# Patient Record
Sex: MALE | Race: WHITE
[De-identification: names, ages, dates, MRNs, and addresses within clinical notes are randomized per-mention and may not be internally consistent; named-entity substitution may affect disease eponyms.]

---

## 2020-05-17 ENCOUNTER — HOSPITAL ENCOUNTER (INPATIENT)
Dept: HOSPITAL 61 - ER | Age: 55
LOS: 3 days | Discharge: HOME | DRG: 897 | End: 2020-05-20
Attending: INTERNAL MEDICINE | Admitting: INTERNAL MEDICINE
Payer: COMMERCIAL

## 2020-05-17 VITALS — BODY MASS INDEX: 25.34 KG/M2 | WEIGHT: 152.12 LBS | HEIGHT: 65 IN

## 2020-05-17 VITALS — DIASTOLIC BLOOD PRESSURE: 94 MMHG | SYSTOLIC BLOOD PRESSURE: 147 MMHG

## 2020-05-17 VITALS — DIASTOLIC BLOOD PRESSURE: 100 MMHG | SYSTOLIC BLOOD PRESSURE: 152 MMHG

## 2020-05-17 VITALS — DIASTOLIC BLOOD PRESSURE: 94 MMHG | SYSTOLIC BLOOD PRESSURE: 148 MMHG

## 2020-05-17 DIAGNOSIS — F10.239: Primary | ICD-10-CM

## 2020-05-17 DIAGNOSIS — Z71.41: ICD-10-CM

## 2020-05-17 DIAGNOSIS — D69.6: ICD-10-CM

## 2020-05-17 DIAGNOSIS — E87.2: ICD-10-CM

## 2020-05-17 DIAGNOSIS — M47.9: ICD-10-CM

## 2020-05-17 DIAGNOSIS — E16.1: ICD-10-CM

## 2020-05-17 DIAGNOSIS — Z79.899: ICD-10-CM

## 2020-05-17 DIAGNOSIS — M50.322: ICD-10-CM

## 2020-05-17 LAB
ACETAMIN: < 2 MCG/ML (ref 10–30)
ALBUMIN SERPL-MCNC: 3.6 G/DL (ref 3.4–5)
ALBUMIN/GLOB SERPL: 0.9 {RATIO} (ref 1–1.7)
ALP SERPL-CCNC: 116 U/L (ref 46–116)
ALT SERPL-CCNC: 158 U/L (ref 16–63)
ANION GAP SERPL CALC-SCNC: 19 MMOL/L (ref 6–14)
AST SERPL-CCNC: 273 U/L (ref 15–37)
BASOPHILS # BLD AUTO: 0 X10^3/UL (ref 0–0.2)
BASOPHILS NFR BLD: 1 % (ref 0–3)
BILIRUB SERPL-MCNC: 1.4 MG/DL (ref 0.2–1)
BUN SERPL-MCNC: 10 MG/DL (ref 8–26)
BUN/CREAT SERPL: 10 (ref 6–20)
CALCIUM SERPL-MCNC: 8.7 MG/DL (ref 8.5–10.1)
CHLORIDE SERPL-SCNC: 98 MMOL/L (ref 98–107)
CO2 SERPL-SCNC: 19 MMOL/L (ref 21–32)
CREAT SERPL-MCNC: 1 MG/DL (ref 0.7–1.3)
EOSINOPHIL NFR BLD: 0 % (ref 0–3)
EOSINOPHIL NFR BLD: 0 X10^3/UL (ref 0–0.7)
ERYTHROCYTE [DISTWIDTH] IN BLOOD BY AUTOMATED COUNT: 15.1 % (ref 11.5–14.5)
ETHANOL SERPL-MCNC: 25 MG/DL (ref 0–10)
GFR SERPLBLD BASED ON 1.73 SQ M-ARVRAT: 77.9 ML/MIN
GLOBULIN SER-MCNC: 3.9 G/DL (ref 2.2–3.8)
GLUCOSE SERPL-MCNC: 106 MG/DL (ref 70–99)
HCT VFR BLD CALC: 44.3 % (ref 39–53)
HGB BLD-MCNC: 15 G/DL (ref 13–17.5)
LYMPHOCYTES # BLD: 0.4 X10^3/UL (ref 1–4.8)
LYMPHOCYTES NFR BLD AUTO: 8 % (ref 24–48)
MAGNESIUM SERPL-MCNC: 2.1 MG/DL (ref 1.8–2.4)
MCH RBC QN AUTO: 31 PG (ref 25–35)
MCHC RBC AUTO-ENTMCNC: 34 G/DL (ref 31–37)
MCV RBC AUTO: 93 FL (ref 79–100)
MONO #: 0.7 X10^3/UL (ref 0–1.1)
MONOCYTES NFR BLD: 13 % (ref 0–9)
NEUT #: 4.3 X10^3/UL (ref 1.8–7.7)
NEUTROPHILS NFR BLD AUTO: 78 % (ref 31–73)
PLATELET # BLD AUTO: 97 X10^3/UL (ref 140–400)
POTASSIUM SERPL-SCNC: 3.7 MMOL/L (ref 3.5–5.1)
PROT SERPL-MCNC: 7.5 G/DL (ref 6.4–8.2)
RBC # BLD AUTO: 4.78 X10^6/UL (ref 4.3–5.7)
SALIC: < 2.8 MG/DL (ref 2.8–20)
SODIUM SERPL-SCNC: 136 MMOL/L (ref 136–145)
WBC # BLD AUTO: 5.5 X10^3/UL (ref 4–11)

## 2020-05-17 PROCEDURE — 83735 ASSAY OF MAGNESIUM: CPT

## 2020-05-17 PROCEDURE — 72125 CT NECK SPINE W/O DYE: CPT

## 2020-05-17 PROCEDURE — 80329 ANALGESICS NON-OPIOID 1 OR 2: CPT

## 2020-05-17 PROCEDURE — 73080 X-RAY EXAM OF ELBOW: CPT

## 2020-05-17 PROCEDURE — G0480 DRUG TEST DEF 1-7 CLASSES: HCPCS

## 2020-05-17 PROCEDURE — 85025 COMPLETE CBC W/AUTO DIFF WBC: CPT

## 2020-05-17 PROCEDURE — 83690 ASSAY OF LIPASE: CPT

## 2020-05-17 PROCEDURE — 70450 CT HEAD/BRAIN W/O DYE: CPT

## 2020-05-17 PROCEDURE — 36415 COLL VENOUS BLD VENIPUNCTURE: CPT

## 2020-05-17 PROCEDURE — G0378 HOSPITAL OBSERVATION PER HR: HCPCS

## 2020-05-17 PROCEDURE — 80053 COMPREHEN METABOLIC PANEL: CPT

## 2020-05-17 RX ADMIN — BACITRACIN SCH MLS/HR: 5000 INJECTION, POWDER, FOR SOLUTION INTRAMUSCULAR at 21:54

## 2020-05-17 NOTE — RAD
PROCEDURE: ELBOW RIGHT 3V

 

STUDY DATE: 5/17/2020

 

CLINICAL INDICATION / HISTORY: Right elbow pain after a fall.

 

TECHNIQUE: Right Elbow 3 views

 

COMPARISON: None

 

FINDINGS: AP lateral and oblique views of the right elbow demonstrate no 

evidence of fracture, subluxation, or dislocation. Soft tissues 

unremarkable.

 

IMPRESSION: No acute osseous abnormality.

 

Electronically signed by: Garry Trejo MD (5/17/2020 6:17 PM) 

[facility] – [place]

## 2020-05-17 NOTE — RAD
CT head and cervical spine without contrast

 

History: Seizure, fall, hit head on ground, headache and neck pain

 

Technique: Noncontrast CT imaging was performed of the head and cervical 

spine. Multiplanar reconstruction images are submitted.  Exposure: One or 

more of the following individualized dose reduction techniques were 

utilized for this examination:  1. Automated exposure control  2. 

Adjustment of the mA and/or kV according to patient size  3. Use of 

iterative reconstruction technique.

 

Head CT 

 

Comparison: None

 

Findings: No acute extra-axial or parenchymal hemorrhage is identified. 

There is no significant intra-axial mass effect, midline shift, or 

extra-axial fluid collection. The gray-white differentiation of the major 

vascular territories is preserved. The ventricles, sulci, and cisterns are

within normal limits in size and configuration.   The mastoid air cells 

and the visualized paranasal sinuses are aerated. There is no significant 

focal calvarial abnormality.

 

Impression:

1. No acute intracranial abnormality is identified.

 

Cervical spine CT 

 

Comparison:  None

 

Findings: No acute cervical spine fracture is identified. Vertebral body 

stature and AP alignment are within normal limits. Atlanto-axial distance 

is within normal limits. There is appropriate alignment of lateral masses 

of C1 relative to C2. Occipital condylar-C1 relationship is maintained. 

There is mild-to-moderate C5-6 degenerative disc disease, also mild 

spondylosis at this level. There is multilevel cervical facet degenerative

change.

 

Impression:

1. No acute cervical spine fracture is identified.

2. There is C5-6 degenerative disc disease and spondylosis.

 

Electronically signed by: Kana Ochoa MD (5/17/2020 5:16 PM) [facility]

## 2020-05-17 NOTE — PHYS DOC
Past Medical History


Past Medical History:  Alcoholism


Past Surgical History:  No Surgical History


Smoking Status:  Never Smoker


Alcohol Use:  Heavy





General Adult


EDM:


Chief Complaint:  SEIZURE





HPI:


HPI:





Patient is a 55  year old male who was brought here by EMS from home after he 

had a seizure.  His wife witnessed him shaky and then passed out fell down he 

hit head on the ground.  Patient said he is an alcoholic he decided to stop 

drinking 2 days ago.  Patient drinks heavily every day.  Patient denies suicidal

ideation, denies homicidal ideation.  Patient said he might have hit the right 

elbow on the ground when he fell down because he complained of right elbow pain.

Patient denies any abdominal pain , no chest pain.  Patient denies any history 

of seizure disorder or denies history of withdrawal seizure from alcohol in the 

past.





Review of Systems:


Review of Systems:


Constitutional:  Denies fever or chills. []


Eyes:  Denies change in visual acuity. []


HENT:  Denies nasal congestion or sore throat. [] 


Respiratory:  Denies cough or shortness of breath. [] 


Cardiovascular:  Denies chest pain or edema. [] 


GI:  Denies abdominal pain, nausea, vomiting, bloody stools or diarrhea. [] 


:  Denies dysuria. [] 


Musculoskeletal:  Denies back pain , positive for neck pain, right elbow pain.


Integument:  Denies rash. [] 


Neurologic: Positive for headache, no  focal weakness or sensory changes. [] 


Endocrine:  Denies polyuria or polydipsia. [] 


Lymphatic:  Denies swollen glands. [] 


Psychiatric: Positive for anxiety shakiness.  Denies homicidal ideation denies 

suicidal ideation.





Heart Score:


Risk Factors:


Risk Factors:  DM, Current or recent (<one month) smoker, HTN, HLP, family 

history of CAD, obesity.


Risk Scores:


Score 0 - 3:  2.5% MACE over next 6 weeks - Discharge Home


Score 4 - 6:  20.3% MACE over next 6 weeks - Admit for Clinical Observation


Score 7 - 10:  72.7% MACE over next 6 weeks - Early Invasive Strategies





Current Medications:





Current Medications








 Medications


  (Trade)  Dose


 Ordered  Sig/Shelton  Start Time


 Stop Time Status Last Admin


Dose Admin


 


 Chlordiazepoxide


  (Librium)  25 mg  1X  ONCE  20 16:30


 20 16:33 DC  





 


 Lorazepam


  (Ativan Inj)  2 mg  1X  ONCE  20 16:30


 5/17/20 16:33 DC 20 16:28


2 MG


 


 Sodium Chloride  1,000 ml @ 


 1,000 mls/hr  1X  ONCE  20 15:00


 20 15:59 DC 20 15:06


1,000 MLS/HR











Allergies:


Allergies:





Allergies








Coded Allergies Type Severity Reaction Last Updated Verified


 


  No Known Drug Allergies    20 No











Physical Exam:


PE:





Constitutional: Well developed, well nourished, no acute distress, non-toxic 

appearance. []


HENT: Normocephalic, atraumatic, bilateral external ears normal, oropharynx 

moist, no oral exudates, nose normal. []


Eyes: PERRLA, EOMI, conjunctiva normal, no discharge. [] 


Neck: Normal range of motion, no tenderness, supple, no stridor. [] 


Cardiovascular: sinus tachycardia, regular rhythm, no murmur []


Lungs & Thorax:  Bilateral breath sounds clear to auscultation []


Abdomen: Bowel sounds normal, soft, no tenderness, no masses, no pulsatile 

masses. [] 


Skin: Warm, dry, no erythema, no rash. [] 


Back: No tenderness, no CVA tenderness. [] 


Extremities: No tenderness, no cyanosis, no clubbing, ROM intact, no edema. 

right elbow contusion, tender to palpation, no deformity. 


Neurologic: Alert and oriented X 3, normal motor function, normal sensory fun

ction, no focal deficits noted. []


Psychologic: Affect normal, judgement normal, mood normal.  APPEARED VERY 

ANXIOUS, twisting and shaking.





Current Patient Data:


Labs:





                                Laboratory Tests








Test


 20


14:55


 


White Blood Count


 5.5 x10^3/uL


(4.0-11.0)


 


Red Blood Count


 4.78 x10^6/uL


(4.30-5.70)


 


Hemoglobin


 15.0 g/dL


(13.0-17.5)


 


Hematocrit


 44.3 %


(39.0-53.0)


 


Mean Corpuscular Volume


 93 fL ()





 


Mean Corpuscular Hemoglobin 31 pg (25-35)  


 


Mean Corpuscular Hemoglobin


Concent 34 g/dL


(31-37)


 


Red Cell Distribution Width


 15.1 %


(11.5-14.5)  H


 


Platelet Count


 97 x10^3/uL


(140-400)  L


 


Neutrophils (%) (Auto) 78 % (31-73)  H


 


Lymphocytes (%) (Auto) 8 % (24-48)  L


 


Monocytes (%) (Auto) 13 % (0-9)  H


 


Eosinophils (%) (Auto) 0 % (0-3)  


 


Basophils (%) (Auto) 1 % (0-3)  


 


Neutrophils # (Auto)


 4.3 x10^3/uL


(1.8-7.7)


 


Lymphocytes # (Auto)


 0.4 x10^3/uL


(1.0-4.8)  L


 


Monocytes # (Auto)


 0.7 x10^3/uL


(0.0-1.1)


 


Eosinophils # (Auto)


 0.0 x10^3/uL


(0.0-0.7)


 


Basophils # (Auto)


 0.0 x10^3/uL


(0.0-0.2)


 


Sodium Level


 136 mmol/L


(136-145)


 


Potassium Level


 3.7 mmol/L


(3.5-5.1)


 


Chloride Level


 98 mmol/L


()


 


Carbon Dioxide Level


 19 mmol/L


(21-32)  L


 


Anion Gap 19 (6-14)  H


 


Blood Urea Nitrogen


 10 mg/dL


(8-26)


 


Creatinine


 1.0 mg/dL


(0.7-1.3)


 


Estimated GFR


(Cockcroft-Gault) 77.9  





 


BUN/Creatinine Ratio 10 (6-20)  


 


Glucose Level


 106 mg/dL


(70-99)  H


 


Calcium Level


 8.7 mg/dL


(8.5-10.1)


 


Magnesium Level


 2.1 mg/dL


(1.8-2.4)


 


Total Bilirubin


 1.4 mg/dL


(0.2-1.0)  H


 


Aspartate Amino Transferase


(AST) 273 U/L


(15-37)  H


 


Alanine Aminotransferase (ALT)


 158 U/L


(16-63)  H


 


Alkaline Phosphatase


 116 U/L


()


 


Total Protein


 7.5 g/dL


(6.4-8.2)


 


Albumin


 3.6 g/dL


(3.4-5.0)


 


Albumin/Globulin Ratio


 0.9 (1.0-1.7)


L


 


Lipase


 272 U/L


()


 


Salicylates Level


 < 2.8 mg/dL


(2.8-20.0)  L


 


Salicylate Last Dose Date Unk  


 


Salicylate Last Dose Time Unk  


 


Acetaminophen Level


 < 2 mcg/ml


(10-30)  L


 


Acetaminophen Last Dose Date Unk  


 


Acetaminophen Last Dose Time Unk  


 


Ethyl Alcohol Level


 25 mg/dL


(0-10)  H





                                Laboratory Tests


20 14:55








                                Laboratory Tests


20 14:55








Vital Signs:





                                   Vital Signs








  Date Time  Temp Pulse Resp B/P (MAP) Pulse Ox O2 Delivery O2 Flow Rate FiO2


 


20 14:52 98.6 120 20 184/106 (132) 96 Room Air  





 98.6       











EKG:


EKG:


[]





Radiology/Procedures:


Radiology/Procedures:


[][facility]


                    [address]  [place], [place] [address] [phone]


                                        


                                 IMAGING REPORT





                                     Signed





PATIENT: GIANFRANCO LOPEZ  ACCOUNT: [id number]     MRN#: [id number]


: 1965           LOCATION: ER              AGE: 55


SEX: M                    EXAM DT: 20         ACCESSION#: [id number]


STATUS: REG ER            ORD. PHYSICIAN: SHERLY CHILDERS DO


REASON: had a seizure, fell down hit  head on ground, headache, neck pain


PROCEDURE: CT HEAD AND CERVICAL SPINE WO





CT head and cervical spine without contrast


 


History: Seizure, fall, hit head on ground, headache and neck pain


 


Technique: Noncontrast CT imaging was performed of the head and cervical 


spine. Multiplanar reconstruction images are submitted.  Exposure: One or 


more of the following individualized dose reduction techniques were 


utilized for this examination:  1. Automated exposure control  2. 


Adjustment of the mA and/or kV according to patient size  3. Use of 


iterative reconstruction technique.


 


Head CT 


 


Comparison: None


 


Findings: No acute extra-axial or parenchymal hemorrhage is identified. 


There is no significant intra-axial mass effect, midline shift, or 


extra-axial fluid collection. The gray-white differentiation of the major 


vascular territories is preserved. The ventricles, sulci, and cisterns are


within normal limits in size and configuration.   The mastoid air cells 


and the visualized paranasal sinuses are aerated. There is no significant 


focal calvarial abnormality.


 


Impression:


1. No acute intracranial abnormality is identified.


 


Cervical spine CT 


 


Comparison:  None


 


Findings: No acute cervical spine fracture is identified. Vertebral body 


stature and AP alignment are within normal limits. Atlanto-axial distance 


is within normal limits. There is appropriate alignment of lateral masses 


of C1 relative to C2. Occipital condylar-C1 relationship is maintained. 


There is mild-to-moderate C5-6 degenerative disc disease, also mild 


spondylosis at this level. There is multilevel cervical facet degenerative


change.


 


Impression:


1. No acute cervical spine fracture is identified.


2. There is C5-6 degenerative disc disease and spondylosis.


 


Electronically signed by: Antonia Martienz MD (2020 5:16 PM) [facility]














DICTATED and SIGNED BY:     ANTONIA MARTINEZ MD


DATE:     20 171








                            [facility]


                    [address] [place] [address]


                                 [phone]


                                        


                                 IMAGING REPORT





                                     Signed





PATIENT: GIANFRANCO LOPEZ  ACCOUNT: [id number]     MRN#: [id number]


: 1965           LOCATION: [address] [place] [address]         AGE: 55


SEX: M                    EXAM DT: 20         ACCESSION#: [id number]


STATUS: ADM IN            ORD. PHYSICIAN: SHERLY CHILDERS DO


REASON: fell, right elbow injured


PROCEDURE: ELBOW RIGHT 3V





PROCEDURE: ELBOW RIGHT 3V


 


STUDY DATE: 2020


 


CLINICAL INDICATION / HISTORY: Right elbow pain after a fall.


 


TECHNIQUE: Right Elbow 3 views


 


COMPARISON: None


 


FINDINGS: AP lateral and oblique views of the right elbow demonstrate no 


evidence of fracture, subluxation, or dislocation. Soft tissues 


unremarkable.


 


IMPRESSION: No acute osseous abnormality.


 


Electronically signed by: Mk Trejo MD (2020 6:17 PM) 


[facility] [place]














DICTATED and SIGNED BY:     MK TREJO MD


DATE:     20





Course & Med Decision Making:


Course & Med Decision Making


Pertinent Labs and Imaging studies reviewed. (See chart for details)





Patient is a 55-year-old male who was evaluated in the ER today due to seizure 

activity from alcohol withdrawal.  Patient was required multiple doses of Ativan

 IV.  He continued to feel shaky, will admit him to hospital for evaluation and 

treatment.





Dragon Disclaimer:


Dragon Disclaimer:


This electronic medical record was generated, in whole or in part, using a voice

 recognition dictation system.





Departure


Departure


Impression:  


   Primary Impression:  


   Alcohol withdrawal seizure


Disposition:   ADMITTED AS INPATIENT


Admitting Physician:  ANTONIA (DR. MARQUEZ)


Condition:  IMPROVED


Referrals:  


NO PCP (PCP)











SHERLY CHILDERS DO                May 17, 2020 16:34

## 2020-05-18 VITALS — DIASTOLIC BLOOD PRESSURE: 103 MMHG | SYSTOLIC BLOOD PRESSURE: 161 MMHG

## 2020-05-18 VITALS — DIASTOLIC BLOOD PRESSURE: 86 MMHG | SYSTOLIC BLOOD PRESSURE: 137 MMHG

## 2020-05-18 VITALS — DIASTOLIC BLOOD PRESSURE: 106 MMHG | SYSTOLIC BLOOD PRESSURE: 172 MMHG

## 2020-05-18 VITALS — SYSTOLIC BLOOD PRESSURE: 146 MMHG | DIASTOLIC BLOOD PRESSURE: 106 MMHG

## 2020-05-18 VITALS — SYSTOLIC BLOOD PRESSURE: 145 MMHG | DIASTOLIC BLOOD PRESSURE: 96 MMHG

## 2020-05-18 VITALS — DIASTOLIC BLOOD PRESSURE: 101 MMHG | SYSTOLIC BLOOD PRESSURE: 176 MMHG

## 2020-05-18 RX ADMIN — FOLIC ACID SCH MLS/HR: 5 INJECTION, SOLUTION INTRAMUSCULAR; INTRAVENOUS; SUBCUTANEOUS at 10:08

## 2020-05-18 RX ADMIN — BACITRACIN SCH MLS/HR: 5000 INJECTION, POWDER, FOR SOLUTION INTRAMUSCULAR at 13:39

## 2020-05-18 RX ADMIN — BACITRACIN SCH MLS/HR: 5000 INJECTION, POWDER, FOR SOLUTION INTRAMUSCULAR at 08:12

## 2020-05-18 NOTE — PDOC1
History and Physical


Date of Admission


Date of Admission


2020





Identification/Chief Complaint


Chief Complaint


Alcohol withdrawal seizures


Problems:  


(1) Alcohol withdrawal seizure





Source


Source:  Chart review





History of Present Illness


History of Present Illness


Patient is a 55-year-old gentleman with past medical history of alcoholism who 

was brought in by emergency medical services after being found at home with 

seizure activity.  He was at home with his wife attempting to quit drinking and 

unfortunately developed withdrawal symptoms and subsequently the above-mentioned

seizure activity.  The patient had stopped drinking 2 days prior to the event 

the amount of alcohol intake is not well documented but certainly is significant

enough that the patient developed withdrawal symptoms.  He was evaluated in the 

emergency department and found to have no fractures likely.  Patient at the time

of my evaluation is quite sedated as a consequence of his treatment plan that 

includes benzodiazepines to control his withdrawal symptoms.  There was no fever

no chills no recent infections no sick contacts reported prior to the event 

there was no nausea vomiting or diarrhea reported no hematemesis no hematochezia

no urinary symptoms were reported either.  The patient has been admitted to the 

medical floor in order to continue treatment for alcohol withdrawal symptoms.





Past Medical History


Past Medical History


Alcoholism





Current Problem List


Problem List


Problems


Medical Problems:


(1) Alcohol withdrawal seizure


Status: Acute  











Current Medications


Current Medications





Current Medications








 Medications


  (Trade)  Dose


 Ordered  Sig/[place]  Start Time


 Stop Time Status Last Admin


Dose Admin


 


 Chlordiazepoxide


  (Librium)  25 mg  1X  ONCE  20 16:30


 20 16:33 DC 20 17:22


25 MG


 


 Clonidine HCl


  (Catapres)  0.1 mg  PRN Q1HR  PRN  20 07:30


     





 


 Enalaprilat


  (Vasotec Inj)  1.25 mg  PRN Q6HRS  PRN  20 07:30


     





 


 Folic Acid


  (Folic Acid)  1 mg  DAILY  20 09:00


     





 


 Lorazepam


  (Ativan Inj)  2 mg  PRN Q1HR  PRN  20 02:30


    20 08:16


2 MG


 


 Lorazepam


  (Ativan)  8 mg  PRN Q1HR  PRN  20 07:30


     





 


 Multivitamins


  (Thera M Plus)  1 tab  DAILY  20 09:00


     





 


 Multivitamins 10


 ml/Folic Acid 1


 mg/Sodium Chloride  1,010.2 ml


  @ 99.902


 mls/hr  DAILY  20 09:00


 20 19:07  20 10:08


99.902 MLS/HR


 


 Multivitamins 10


 ml/Thiamine HCl


 100 mg/Folic Acid


 1 mg/Sodium


 Chloride  1,011.2 ml


  @ 100 mls/


 hr  DAILY  20 09:00


 20 19:07 UNV  





 


 Ondansetron HCl


  (Zofran)  4 mg  PRN Q8HRS  PRN  20 17:45


 20 17:44   





 


 Sodium Chloride  1,000 ml @ 


 100 mls/hr  Q10H  20 17:39


 20 17:38  20 08:12


100 MLS/HR


 


 Thiamine


 Mononitrate


  (Vitamin B-1)  100 mg  DAILY  20 09:00


    20 08:11


100 MG


 


 Thiamine HCl 100


 mg/Dextrose  51 ml @ 


 100 mls/hr  DAILY  20 09:00


 20 09:31 Cancel  














Allergies


Allergies





Allergies








Coded Allergies Type Severity Reaction Last Updated Verified


 


  No Known Drug Allergies    20 No











ROS


Review of System


Unable to obtain due to the current state and sedation





Physical Exam


Physical Exam


GEN.:    No apparent distress.  Sedated


HEENT:    Head is normocephalic, atraumatic


NECK:    Supple.  


LUNGS:    Clear to auscultation.


HEART:    RRR, S1, S2 present.  Peripheral pulses intact


ABDOMEN:    Soft, nontender.  Positive bowel sounds.


EXTREMITIES:    Without any cyanosis.    


NEUROLOGIC:    Sedated, moves all extremities and retracts to painful stimuli


PSYCHIATRIC:    Unable to assess


SKIN:   No ulcerations





Vitals


Vitals





Vital Signs








  Date Time  Temp Pulse Resp B/P (MAP) Pulse Ox O2 Delivery O2 Flow Rate FiO2


 


20 11:35 98.4 89 18 161/103 (122) 97 Room Air  





 98.4       











Labs


Labs





Laboratory Tests








Test


 20


14:55


 


White Blood Count


 5.5 x10^3/uL


(4.0-11.0)


 


Red Blood Count


 4.78 x10^6/uL


(4.30-5.70)


 


Hemoglobin


 15.0 g/dL


(13.0-17.5)


 


Hematocrit


 44.3 %


(39.0-53.0)


 


Mean Corpuscular Volume 93 fL () 


 


Mean Corpuscular Hemoglobin 31 pg (25-35) 


 


Mean Corpuscular Hemoglobin


Concent 34 g/dL


(31-37)


 


Red Cell Distribution Width


 15.1 %


(11.5-14.5)


 


Platelet Count


 97 x10^3/uL


(140-400)


 


Neutrophils (%) (Auto) 78 % (31-73) 


 


Lymphocytes (%) (Auto) 8 % (24-48) 


 


Monocytes (%) (Auto) 13 % (0-9) 


 


Eosinophils (%) (Auto) 0 % (0-3) 


 


Basophils (%) (Auto) 1 % (0-3) 


 


Neutrophils # (Auto)


 4.3 x10^3/uL


(1.8-7.7)


 


Lymphocytes # (Auto)


 0.4 x10^3/uL


(1.0-4.8)


 


Monocytes # (Auto)


 0.7 x10^3/uL


(0.0-1.1)


 


Eosinophils # (Auto)


 0.0 x10^3/uL


(0.0-0.7)


 


Basophils # (Auto)


 0.0 x10^3/uL


(0.0-0.2)


 


Sodium Level


 136 mmol/L


(136-145)


 


Potassium Level


 3.7 mmol/L


(3.5-5.1)


 


Chloride Level


 98 mmol/L


()


 


Carbon Dioxide Level


 19 mmol/L


(21-32)


 


Anion Gap 19 (6-14) 


 


Blood Urea Nitrogen


 10 mg/dL


(8-26)


 


Creatinine


 1.0 mg/dL


(0.7-1.3)


 


Estimated GFR


(Cockcroft-Gault) 77.9 





 


BUN/Creatinine Ratio 10 (6-20) 


 


Glucose Level


 106 mg/dL


(70-99)


 


Calcium Level


 8.7 mg/dL


(8.5-10.1)


 


Magnesium Level


 2.1 mg/dL


(1.8-2.4)


 


Total Bilirubin


 1.4 mg/dL


(0.2-1.0)


 


Aspartate Amino Transf


(AST/SGOT) 273 U/L


(15-37)


 


Alanine Aminotransferase


(ALT/SGPT) 158 U/L


(16-63)


 


Alkaline Phosphatase


 116 U/L


()


 


Total Protein


 7.5 g/dL


(6.4-8.2)


 


Albumin


 3.6 g/dL


(3.4-5.0)


 


Albumin/Globulin Ratio 0.9 (1.0-1.7) 


 


Lipase


 272 U/L


()


 


Salicylates Level


 < 2.8 mg/dL


(2.8-20.0)


 


Salicylate Last Dose Date Unk 


 


Salicylate Last Dose Time Unk 


 


Acetaminophen Level


 < 2 mcg/ml


(10-30)


 


Acetaminophen Last Dose Date Unk 


 


Acetaminophen Last Dose Time Unk 


 


Ethyl Alcohol Level


 25 mg/dL


(0-10)








Laboratory Tests








Test


 20


14:55


 


White Blood Count


 5.5 x10^3/uL


(4.0-11.0)


 


Red Blood Count


 4.78 x10^6/uL


(4.30-5.70)


 


Hemoglobin


 15.0 g/dL


(13.0-17.5)


 


Hematocrit


 44.3 %


(39.0-53.0)


 


Mean Corpuscular Volume 93 fL () 


 


Mean Corpuscular Hemoglobin 31 pg (25-35) 


 


Mean Corpuscular Hemoglobin


Concent 34 g/dL


(31-37)


 


Red Cell Distribution Width


 15.1 %


(11.5-14.5)


 


Platelet Count


 97 x10^3/uL


(140-400)


 


Neutrophils (%) (Auto) 78 % (31-73) 


 


Lymphocytes (%) (Auto) 8 % (24-48) 


 


Monocytes (%) (Auto) 13 % (0-9) 


 


Eosinophils (%) (Auto) 0 % (0-3) 


 


Basophils (%) (Auto) 1 % (0-3) 


 


Neutrophils # (Auto)


 4.3 x10^3/uL


(1.8-7.7)


 


Lymphocytes # (Auto)


 0.4 x10^3/uL


(1.0-4.8)


 


Monocytes # (Auto)


 0.7 x10^3/uL


(0.0-1.1)


 


Eosinophils # (Auto)


 0.0 x10^3/uL


(0.0-0.7)


 


Basophils # (Auto)


 0.0 x10^3/uL


(0.0-0.2)


 


Sodium Level


 136 mmol/L


(136-145)


 


Potassium Level


 3.7 mmol/L


(3.5-5.1)


 


Chloride Level


 98 mmol/L


()


 


Carbon Dioxide Level


 19 mmol/L


(21-32)


 


Anion Gap 19 (6-14) 


 


Blood Urea Nitrogen


 10 mg/dL


(8-26)


 


Creatinine


 1.0 mg/dL


(0.7-1.3)


 


Estimated GFR


(Cockcroft-Gault) 77.9 





 


BUN/Creatinine Ratio 10 (6-20) 


 


Glucose Level


 106 mg/dL


(70-99)


 


Calcium Level


 8.7 mg/dL


(8.5-10.1)


 


Magnesium Level


 2.1 mg/dL


(1.8-2.4)


 


Total Bilirubin


 1.4 mg/dL


(0.2-1.0)


 


Aspartate Amino Transf


(AST/SGOT) 273 U/L


(15-37)


 


Alanine Aminotransferase


(ALT/SGPT) 158 U/L


(16-63)


 


Alkaline Phosphatase


 116 U/L


()


 


Total Protein


 7.5 g/dL


(6.4-8.2)


 


Albumin


 3.6 g/dL


(3.4-5.0)


 


Albumin/Globulin Ratio 0.9 (1.0-1.7) 


 


Lipase


 272 U/L


()


 


Salicylates Level


 < 2.8 mg/dL


(2.8-20.0)


 


Salicylate Last Dose Date Unk 


 


Salicylate Last Dose Time Unk 


 


Acetaminophen Level


 < 2 mcg/ml


(10-30)


 


Acetaminophen Last Dose Date Unk 


 


Acetaminophen Last Dose Time Unk 


 


Ethyl Alcohol Level


 25 mg/dL


(0-10)











Images


Images





                    [address] [place] [address]


                                 [phone]


                                        


                                 IMAGING REPORT





                                     Signed





PATIENT: GIANFRANCO LOPEZ  ACCOUNT: [id number]     MRN#: [id number]


: 1965           LOCATION: ER              AGE: 55


SEX: M                    EXAM DT: 20         ACCESSION#: [id number]


STATUS: REG ER            ORD. PHYSICIAN: SHERLY CHILDERS DO


REASON: had a seizure, fell down hit  head on ground, headache, neck pain


PROCEDURE: CT HEAD AND CERVICAL SPINE WO





CT head and cervical spine without contrast


 


History: Seizure, fall, hit head on ground, headache and neck pain


 


Technique: Noncontrast CT imaging was performed of the head and cervical 


spine. Multiplanar reconstruction images are submitted.  Exposure: One or 


more of the following individualized dose reduction techniques were 


utilized for this examination:  1. Automated exposure control  2. 


Adjustment of the mA and/or kV according to patient size  3. Use of 


iterative reconstruction technique.


 


Head CT 


 


Comparison: None


 


Findings: No acute extra-axial or parenchymal hemorrhage is identified. 


There is no significant intra-axial mass effect, midline shift, or 


extra-axial fluid collection. The gray-white differentiation of the major 


vascular territories is preserved. The ventricles, sulci, and cisterns are


within normal limits in size and configuration.   The mastoid air cells 


and the visualized paranasal sinuses are aerated. There is no significant 


focal calvarial abnormality.


 


Impression:


1. No acute intracranial abnormality is identified.


 


Cervical spine CT 


 


Comparison:  None


 


Findings: No acute cervical spine fracture is identified. Vertebral body 


stature and AP alignment are within normal limits. Atlanto-axial distance 


is within normal limits. There is appropriate alignment of lateral masses 


of C1 relative to C2. Occipital condylar-C1 relationship is maintained. 


There is mild-to-moderate C5-6 degenerative disc disease, also mild 


spondylosis at this level. There is multilevel cervical facet degenerative


change.


 


Impression:


1. No acute cervical spine fracture is identified.


2. There is C5-6 degenerative disc disease and spondylosis.


 


Electronically signed by: Antonia Martinez MD (2020 5:16 PM) [facility]














DICTATED and SIGNED BY:     ANTONIA MARTINEZ MD


DATE:     20 1716








                            [facility]


                    [address]  [place], [place] [id number] [phone]


                                        


                                 IMAGING REPORT





                                     Signed





PATIENT: GIANFRANCO LOPEZ  ACCOUNT: [id number]     MRN#: [id number]


: 1965           LOCATION: [address] [place] [address]         AGE: 55


SEX: M                    EXAM DT: 20         ACCESSION#: [id number]


STATUS: ADM IN            ORD. PHYSICIAN: SHERLY CHILDERS DO


REASON: fell, right elbow injured


PROCEDURE: ELBOW RIGHT 3V





PROCEDURE: ELBOW RIGHT 3V


 


STUDY DATE: 2020


 


CLINICAL INDICATION / HISTORY: Right elbow pain after a fall.


 


TECHNIQUE: Right Elbow 3 views


 


COMPARISON: None


 


FINDINGS: AP lateral and oblique views of the right elbow demonstrate no 


evidence of fracture, subluxation, or dislocation. Soft tissues 


unremarkable.


 


IMPRESSION: No acute osseous abnormality.


 


Electronically signed by: Mk Trejo MD (2020 6:17 PM) 


[facility]














DICTATED and SIGNED BY:     MK TREJO MD


DATE:     20 181





Course & Med Decision Making:


Course & Med Decision Making


Pertinent Labs and Imaging studies reviewed. (See chart for details)





Patient is a 55-year-old male who was evaluated in the ER today due to seizure 

activity from alcohol withdrawal.  Patient was required multiple doses of Ativan

IV.  He continued to feel shaky, will admit him to hospital for evaluation and 

treatment.





Dragon Disclaimer:


Dragon Disclaimer:


This electronic medical record was generated, in whole or in part, using a voice

recognition dictation system.





Departure


Departure


Impression:  


   Primary Impression:  


   Alcohol withdrawal seizure


Disposition:   ADMITTED AS INPATIENT


Admitting Physician:  ANTONIA (DR. MARQUEZ)


Condition:  IMPROVED


Referrals:  


NO PCP (PCP)





VTE Prophylaxis Ordered


VTE Prophylaxis Devices:  No


VTE Pharmacological Prophylaxi:  No





Assessment/Plan


Assessment/Plan


Alcohol withdrawal seizure


Alcoholism


Thrombocytopenia


Minimal metabolic acidosis with with high anion gap


Reactive hypoglycemia





Plan


Alcohol withdrawal protocol in place


Seizure precautions


Pain management


Banana bag daily


Counseling will be done once more awake and alert


DVT prophylaxis with SCD and teds











MOLLY VEGA MD             May 18, 2020 12:12

## 2020-05-18 NOTE — NUR
SS following for discharge planning. SS reviewed pt chart and discussed with pt RN. Pt is 
from home with spouse and is currently on room air. Pt is currently on CIWA protocol. PAT 
team referral made for ETOH for assessment and recommendations. SS will continue to follow 
for discharge planning.

## 2020-05-19 VITALS — DIASTOLIC BLOOD PRESSURE: 74 MMHG | SYSTOLIC BLOOD PRESSURE: 142 MMHG

## 2020-05-19 VITALS — DIASTOLIC BLOOD PRESSURE: 92 MMHG | SYSTOLIC BLOOD PRESSURE: 147 MMHG

## 2020-05-19 VITALS — SYSTOLIC BLOOD PRESSURE: 152 MMHG | DIASTOLIC BLOOD PRESSURE: 99 MMHG

## 2020-05-19 VITALS — SYSTOLIC BLOOD PRESSURE: 138 MMHG | DIASTOLIC BLOOD PRESSURE: 105 MMHG

## 2020-05-19 VITALS — SYSTOLIC BLOOD PRESSURE: 123 MMHG | DIASTOLIC BLOOD PRESSURE: 90 MMHG

## 2020-05-19 VITALS — SYSTOLIC BLOOD PRESSURE: 140 MMHG | DIASTOLIC BLOOD PRESSURE: 90 MMHG

## 2020-05-19 RX ADMIN — FOLIC ACID SCH MLS/HR: 5 INJECTION, SOLUTION INTRAMUSCULAR; INTRAVENOUS; SUBCUTANEOUS at 08:34

## 2020-05-19 NOTE — NUR
SS following up with discharge planning. SS reviewed pt chart and discussed with pt RN. Pt 
currently on CIWA protocol. Morro from the PAT team met with pt and discussed ETOH. Pt 
admitted to drinking 4-6 beers per day and a couple shots of whiskey. Pt declining inpatient 
treatment but more interested in outpatient treatment. Resources provided for outpatient 
treatment. Pt also reported having some depression. Referral made to [facility]. SS will continue to follow for discharge planning.

## 2020-05-19 NOTE — NUR
Pt increasingly confused and combative this shift. Multiple doses of Ativan given with no 
improvement. Code Muller called and extra dose of Ativan and Haldol administered per orders. 
Pt repeatedly removing tele monitor; will place when pt agreeable. Pt currently resting in 
bed with alarm set, call light in place. Will continue to monitor.

## 2020-05-20 VITALS — SYSTOLIC BLOOD PRESSURE: 141 MMHG | DIASTOLIC BLOOD PRESSURE: 100 MMHG

## 2020-05-20 VITALS — DIASTOLIC BLOOD PRESSURE: 94 MMHG | SYSTOLIC BLOOD PRESSURE: 147 MMHG

## 2020-05-20 VITALS — SYSTOLIC BLOOD PRESSURE: 151 MMHG | DIASTOLIC BLOOD PRESSURE: 110 MMHG

## 2020-05-20 LAB
ALBUMIN SERPL-MCNC: 3.1 G/DL (ref 3.4–5)
ALBUMIN/GLOB SERPL: 0.8 {RATIO} (ref 1–1.7)
ALP SERPL-CCNC: 102 U/L (ref 46–116)
ALT SERPL-CCNC: 146 U/L (ref 16–63)
ANION GAP SERPL CALC-SCNC: 10 MMOL/L (ref 6–14)
AST SERPL-CCNC: 257 U/L (ref 15–37)
BASOPHILS # BLD AUTO: 0.1 X10^3/UL (ref 0–0.2)
BASOPHILS NFR BLD: 1 % (ref 0–3)
BILIRUB SERPL-MCNC: 2.4 MG/DL (ref 0.2–1)
BUN SERPL-MCNC: 10 MG/DL (ref 8–26)
BUN/CREAT SERPL: 13 (ref 6–20)
CALCIUM SERPL-MCNC: 8.6 MG/DL (ref 8.5–10.1)
CHLORIDE SERPL-SCNC: 101 MMOL/L (ref 98–107)
CO2 SERPL-SCNC: 25 MMOL/L (ref 21–32)
CREAT SERPL-MCNC: 0.8 MG/DL (ref 0.7–1.3)
EOSINOPHIL NFR BLD: 0.1 X10^3/UL (ref 0–0.7)
EOSINOPHIL NFR BLD: 2 % (ref 0–3)
ERYTHROCYTE [DISTWIDTH] IN BLOOD BY AUTOMATED COUNT: 15.1 % (ref 11.5–14.5)
GFR SERPLBLD BASED ON 1.73 SQ M-ARVRAT: 100.7 ML/MIN
GLOBULIN SER-MCNC: 3.9 G/DL (ref 2.2–3.8)
GLUCOSE SERPL-MCNC: 120 MG/DL (ref 70–99)
HCT VFR BLD CALC: 44.5 % (ref 39–53)
HGB BLD-MCNC: 15.3 G/DL (ref 13–17.5)
LYMPHOCYTES # BLD: 0.6 X10^3/UL (ref 1–4.8)
LYMPHOCYTES NFR BLD AUTO: 10 % (ref 24–48)
MCH RBC QN AUTO: 32 PG (ref 25–35)
MCHC RBC AUTO-ENTMCNC: 34 G/DL (ref 31–37)
MCV RBC AUTO: 92 FL (ref 79–100)
MONO #: 1 X10^3/UL (ref 0–1.1)
MONOCYTES NFR BLD: 16 % (ref 0–9)
NEUT #: 4.3 X10^3/UL (ref 1.8–7.7)
NEUTROPHILS NFR BLD AUTO: 71 % (ref 31–73)
PLATELET # BLD AUTO: 101 X10^3/UL (ref 140–400)
POTASSIUM SERPL-SCNC: 3.3 MMOL/L (ref 3.5–5.1)
PROT SERPL-MCNC: 7 G/DL (ref 6.4–8.2)
RBC # BLD AUTO: 4.82 X10^6/UL (ref 4.3–5.7)
SODIUM SERPL-SCNC: 136 MMOL/L (ref 136–145)
WBC # BLD AUTO: 6 X10^3/UL (ref 4–11)

## 2020-05-20 RX ADMIN — FOLIC ACID SCH MLS/HR: 5 INJECTION, SOLUTION INTRAMUSCULAR; INTRAVENOUS; SUBCUTANEOUS at 08:56

## 2020-05-20 NOTE — NUR
SS following up with discharge planning. SS reviewed pt chart and discussed with pt RN. Pt 
remains on CIWA protocol at this time. Pt is currently on room air. Pt cleared by the PAT 
team and provided with resources for outpatient services. Pt will discharge to home when 
medically ready. SS will continue to follow for discharge planning.

## 2020-05-20 NOTE — NUR
Pt left unit at approx 1725 by wheelchair via private vehicle. Pt's IV removed with no 
complications, stable at discharge. Pt discharge information discussed with pt and 
additional questions addressed. Belongings sent with pt at time of discharge and [place] 
knife from security returned to pt.

## 2020-05-20 NOTE — PDOC
PROGRESS NOTES


Chief Complaint


Chief Complaint


Late entry progress note for 5/19/2020





Assessment/Plan


Alcohol withdrawal seizure


Alcoholism


Thrombocytopenia


Minimal metabolic acidosis with with high anion gap


Reactive hypoglycemia





Plan


Alcohol withdrawal protocol in place


Seizure precautions


Pain management


Banana bag daily


Counseling will be done once more awake and alert


DVT prophylaxis with SCD and teds





History of Present Illness


History of Present Illness


No acute events reported overnight, case discussed with nursing staff patient in

no acute distress no complaints during my visit, still quite shaky and requiring

Ativan we will continue to monitor





Vitals


Vitals





Vital Signs








  Date Time  Temp Pulse Resp B/P (MAP) Pulse Ox O2 Delivery O2 Flow Rate FiO2


 


5/20/20 10:54 98.3 74 18 147/94 (111) 96 Room Air  





 98.3       











Physical Exam


Physical Exam


Gen.: Chronically ill-appearing somewhat sedated from Ativan in no apparent 

distress


Head: Normal shape atraumatic


Eyes: Pupils equal reactive to light and accommodation, normal conjunctivae and 

lids


Ears: Normal shape


Nose: Normal shape no trauma


Mouth: No exudates of the back of throat no thrush no lesions


Neck: Supple no JVD no carotid bruit or lymphadenopathy no thyromegaly


Chest: Lungs clear to auscultation with good inspiratory effort no crackles 

rales or rhonchi


Cardiovascular: S1-S2 regular rhythm no murmurs gallops or rubs


Abdomen: Bowel sounds present soft nontender no hepatosplenomegaly appreciated 

sign Extremities: No clubbing no cyanosis no edema peripheral pulses palpated 

bilaterally


Neurological: Alert awake oriented in person time place and situation, cranial 

nerves II through XII intact, no motor or sensory deficits appreciated gait 

unstable and ataxic


Psych: Appropriate mood, cooperative





Labs


LABS





Laboratory Tests








Test


 5/20/20


09:00


 


White Blood Count


 6.0 x10^3/uL


(4.0-11.0)


 


Red Blood Count


 4.82 x10^6/uL


(4.30-5.70)


 


Hemoglobin


 15.3 g/dL


(13.0-17.5)


 


Hematocrit


 44.5 %


(39.0-53.0)


 


Mean Corpuscular Volume 92 fL () 


 


Mean Corpuscular Hemoglobin 32 pg (25-35) 


 


Mean Corpuscular Hemoglobin


Concent 34 g/dL


(31-37)


 


Red Cell Distribution Width


 15.1 %


(11.5-14.5)


 


Platelet Count


 101 x10^3/uL


(140-400)


 


Neutrophils (%) (Auto) 71 % (31-73) 


 


Lymphocytes (%) (Auto) 10 % (24-48) 


 


Monocytes (%) (Auto) 16 % (0-9) 


 


Eosinophils (%) (Auto) 2 % (0-3) 


 


Basophils (%) (Auto) 1 % (0-3) 


 


Neutrophils # (Auto)


 4.3 x10^3/uL


(1.8-7.7)


 


Lymphocytes # (Auto)


 0.6 x10^3/uL


(1.0-4.8)


 


Monocytes # (Auto)


 1.0 x10^3/uL


(0.0-1.1)


 


Eosinophils # (Auto)


 0.1 x10^3/uL


(0.0-0.7)


 


Basophils # (Auto)


 0.1 x10^3/uL


(0.0-0.2)


 


Sodium Level


 136 mmol/L


(136-145)


 


Potassium Level


 3.3 mmol/L


(3.5-5.1)


 


Chloride Level


 101 mmol/L


()


 


Carbon Dioxide Level


 25 mmol/L


(21-32)


 


Anion Gap 10 (6-14) 


 


Blood Urea Nitrogen


 10 mg/dL


(8-26)


 


Creatinine


 0.8 mg/dL


(0.7-1.3)


 


Estimated GFR


(Cockcroft-Gault) 100.7 





 


BUN/Creatinine Ratio 13 (6-20) 


 


Glucose Level


 120 mg/dL


(70-99)


 


Calcium Level


 8.6 mg/dL


(8.5-10.1)


 


Total Bilirubin


 2.4 mg/dL


(0.2-1.0)


 


Aspartate Amino Transf


(AST/SGOT) 257 U/L


(15-37)


 


Alanine Aminotransferase


(ALT/SGPT) 146 U/L


(16-63)


 


Alkaline Phosphatase


 102 U/L


()


 


Total Protein


 7.0 g/dL


(6.4-8.2)


 


Albumin


 3.1 g/dL


(3.4-5.0)


 


Albumin/Globulin Ratio 0.8 (1.0-1.7) 











Assessment and Plan


Assessmemt and Plan


Problems


Medical Problems:


(1) Alcohol withdrawal seizure


Status: Acute  











Comment


Review of Relevant


I have reviewed the following items donell (where applicable) has been applied.


Labs





Laboratory Tests








Test


 5/20/20


09:00


 


White Blood Count


 6.0 x10^3/uL


(4.0-11.0)


 


Red Blood Count


 4.82 x10^6/uL


(4.30-5.70)


 


Hemoglobin


 15.3 g/dL


(13.0-17.5)


 


Hematocrit


 44.5 %


(39.0-53.0)


 


Mean Corpuscular Volume 92 fL () 


 


Mean Corpuscular Hemoglobin 32 pg (25-35) 


 


Mean Corpuscular Hemoglobin


Concent 34 g/dL


(31-37)


 


Red Cell Distribution Width


 15.1 %


(11.5-14.5)


 


Platelet Count


 101 x10^3/uL


(140-400)


 


Neutrophils (%) (Auto) 71 % (31-73) 


 


Lymphocytes (%) (Auto) 10 % (24-48) 


 


Monocytes (%) (Auto) 16 % (0-9) 


 


Eosinophils (%) (Auto) 2 % (0-3) 


 


Basophils (%) (Auto) 1 % (0-3) 


 


Neutrophils # (Auto)


 4.3 x10^3/uL


(1.8-7.7)


 


Lymphocytes # (Auto)


 0.6 x10^3/uL


(1.0-4.8)


 


Monocytes # (Auto)


 1.0 x10^3/uL


(0.0-1.1)


 


Eosinophils # (Auto)


 0.1 x10^3/uL


(0.0-0.7)


 


Basophils # (Auto)


 0.1 x10^3/uL


(0.0-0.2)


 


Sodium Level


 136 mmol/L


(136-145)


 


Potassium Level


 3.3 mmol/L


(3.5-5.1)


 


Chloride Level


 101 mmol/L


()


 


Carbon Dioxide Level


 25 mmol/L


(21-32)


 


Anion Gap 10 (6-14) 


 


Blood Urea Nitrogen


 10 mg/dL


(8-26)


 


Creatinine


 0.8 mg/dL


(0.7-1.3)


 


Estimated GFR


(Cockcroft-Gault) 100.7 





 


BUN/Creatinine Ratio 13 (6-20) 


 


Glucose Level


 120 mg/dL


(70-99)


 


Calcium Level


 8.6 mg/dL


(8.5-10.1)


 


Total Bilirubin


 2.4 mg/dL


(0.2-1.0)


 


Aspartate Amino Transf


(AST/SGOT) 257 U/L


(15-37)


 


Alanine Aminotransferase


(ALT/SGPT) 146 U/L


(16-63)


 


Alkaline Phosphatase


 102 U/L


()


 


Total Protein


 7.0 g/dL


(6.4-8.2)


 


Albumin


 3.1 g/dL


(3.4-5.0)


 


Albumin/Globulin Ratio 0.8 (1.0-1.7) 








Laboratory Tests








Test


 5/20/20


09:00


 


White Blood Count


 6.0 x10^3/uL


(4.0-11.0)


 


Red Blood Count


 4.82 x10^6/uL


(4.30-5.70)


 


Hemoglobin


 15.3 g/dL


(13.0-17.5)


 


Hematocrit


 44.5 %


(39.0-53.0)


 


Mean Corpuscular Volume 92 fL () 


 


Mean Corpuscular Hemoglobin 32 pg (25-35) 


 


Mean Corpuscular Hemoglobin


Concent 34 g/dL


(31-37)


 


Red Cell Distribution Width


 15.1 %


(11.5-14.5)


 


Platelet Count


 101 x10^3/uL


(140-400)


 


Neutrophils (%) (Auto) 71 % (31-73) 


 


Lymphocytes (%) (Auto) 10 % (24-48) 


 


Monocytes (%) (Auto) 16 % (0-9) 


 


Eosinophils (%) (Auto) 2 % (0-3) 


 


Basophils (%) (Auto) 1 % (0-3) 


 


Neutrophils # (Auto)


 4.3 x10^3/uL


(1.8-7.7)


 


Lymphocytes # (Auto)


 0.6 x10^3/uL


(1.0-4.8)


 


Monocytes # (Auto)


 1.0 x10^3/uL


(0.0-1.1)


 


Eosinophils # (Auto)


 0.1 x10^3/uL


(0.0-0.7)


 


Basophils # (Auto)


 0.1 x10^3/uL


(0.0-0.2)


 


Sodium Level


 136 mmol/L


(136-145)


 


Potassium Level


 3.3 mmol/L


(3.5-5.1)


 


Chloride Level


 101 mmol/L


()


 


Carbon Dioxide Level


 25 mmol/L


(21-32)


 


Anion Gap 10 (6-14) 


 


Blood Urea Nitrogen


 10 mg/dL


(8-26)


 


Creatinine


 0.8 mg/dL


(0.7-1.3)


 


Estimated GFR


(Cockcroft-Gault) 100.7 





 


BUN/Creatinine Ratio 13 (6-20) 


 


Glucose Level


 120 mg/dL


(70-99)


 


Calcium Level


 8.6 mg/dL


(8.5-10.1)


 


Total Bilirubin


 2.4 mg/dL


(0.2-1.0)


 


Aspartate Amino Transf


(AST/SGOT) 257 U/L


(15-37)


 


Alanine Aminotransferase


(ALT/SGPT) 146 U/L


(16-63)


 


Alkaline Phosphatase


 102 U/L


()


 


Total Protein


 7.0 g/dL


(6.4-8.2)


 


Albumin


 3.1 g/dL


(3.4-5.0)


 


Albumin/Globulin Ratio 0.8 (1.0-1.7) 








Medications





Current Medications


Lorazepam (Ativan Inj) 2 mg 1X  ONCE IVP  Last administered on 5/17/20at 15:06; 

Start 5/17/20 at 15:00;  Stop 5/17/20 at 15:03;  Status DC


Sodium Chloride 1,000 ml @  1,000 mls/hr 1X  ONCE IV  Last administered on 

5/17/20at 15:06;  Start 5/17/20 at 15:00;  Stop 5/17/20 at 15:59;  Status DC


Lorazepam (Ativan Inj) 2 mg 1X  ONCE IVP  Last administered on 5/17/20at 16:28; 

Start 5/17/20 at 16:30;  Stop 5/17/20 at 16:33;  Status DC


Chlordiazepoxide (Librium) 25 mg 1X  ONCE PO  Last administered on 5/17/20at 

17:22;  Start 5/17/20 at 16:30;  Stop 5/17/20 at 16:33;  Status DC


Ondansetron HCl (Zofran) 4 mg PRN Q8HRS  PRN IV NAUSEA/VOMITING;  Start 5/17/20 

at 17:45;  Stop 5/18/20 at 17:44;  Status DC


Sodium Chloride 1,000 ml @  100 mls/hr Q10H IV  Last administered on 5/18/20at 

08:12;  Start 5/17/20 at 17:39;  Stop 5/18/20 at 17:38;  Status DC


Multivitamins 10 ml/Thiamine HCl 100 mg/Folic Acid 1 mg/Sodium Chloride 1,011.2 

ml  @ 100 mls/ hr DAILY IV ;  Start 5/18/20 at 09:00;  Stop 5/22/20 at 19:07;  

Status Cancel


Lorazepam (Ativan) 4 mg PRN Q1HR  PRN PO For CIWA 8-14 Last administered on 

5/17/20at 22:02;  Start 5/17/20 at 17:45;  Stop 5/18/20 at 07:27;  Status DC


Multivitamins 10 ml/Folic Acid 1 mg/Sodium Chloride 1,010.2 ml  @ 99.902 mls/hr 

DAILY IV  Last administered on 5/20/20at 08:56;  Start 5/18/20 at 09:00;  Stop 

5/22/20 at 19:07


Multivitamins (Thera M Plus) 1 tab DAILY PO ;  Start 5/23/20 at 09:00


Folic Acid (Folic Acid) 1 mg DAILY PO ;  Start 5/23/20 at 09:00


Thiamine HCl 100 mg/Dextrose 51 ml @  100 mls/hr DAILY IV ;  Start 5/23/20 at 

09:00;  Stop 5/27/20 at 09:31;  Status Cancel


Lorazepam (Ativan) 4 mg PRN Q1HR  PRN PO For CIWA 8-14;  Start 5/18/20 at 02:30


Lorazepam (Ativan Inj) 2 mg PRN Q1HR  PRN IV For CIWA 8-14 Last administered on 

5/19/20at 17:52;  Start 5/18/20 at 02:30


Thiamine Mononitrate (Vitamin B-1) 100 mg DAILY PO  Last administered on 

5/18/20at 08:11;  Start 5/18/20 at 09:00;  Stop 5/18/20 at 14:55;  Status DC


Multivitamins 10 ml/Thiamine HCl 100 mg/Folic Acid 1 mg/Sodium Chloride 1,011.2 

ml  @ 100 mls/ hr DAILY IV ;  Start 5/18/20 at 09:00;  Stop 5/22/20 at 19:07;  

Status UNV


Lorazepam (Ativan) 4 mg PRN Q1HR  PRN PO For CIWA 8-14;  Start 5/18/20 at 07:30;

 Status UNV


Lorazepam (Ativan) 8 mg PRN Q1HR  PRN PO For CIWA 15 or greater;  Start 5/18/20 

at 07:30


Clonidine HCl (Catapres) 0.1 mg PRN Q1HR  PRN PO SBP > 180 or DBP > 100, MRX3;  

Start 5/18/20 at 07:30


Enalaprilat (Vasotec Inj) 1.25 mg PRN Q6HRS  PRN IVP HYPERTENSION Last 

administered on 5/18/20at 13:45;  Start 5/18/20 at 07:30


Thiamine Mononitrate (Vitamin B-1) 100 mg DAILY PO ;  Start 5/23/20 at 09:00


Lorazepam (Ativan Inj) 3 mg PRN Q1HR  PRN IVP ANXIETY / AGITATION Last 

administered on 5/19/20at 05:21;  Start 5/18/20 at 23:30


Lorazepam (Ativan Inj) 3 mg 1X  ONCE IVP  Last administered on 5/18/20at 23:24; 

Start 5/18/20 at 23:30;  Stop 5/18/20 at 23:31;  Status DC


Haloperidol Lactate (Haldol Inj) 2.5 mg 1X  ONCE IVP  Last administered on 

5/18/20at 23:47;  Start 5/19/20 at 00:00;  Stop 5/19/20 at 00:01;  Status DC





Active Scripts


Active


Ativan (Lorazepam) 1 Mg Tablet 4 Mg PO PRN Q4HRS PRN 5 Days


Vitamin B-1 (Thiamine Mononitrate) 100 Mg Tablet 100 Mg PO DAILY 30 Days


[Folic Acid] 1 MG Tablet 1 Mg PO DAILY 30 Days


Vitals/I & O





Vital Sign - Last 24 Hours








 5/19/20 5/19/20 5/19/20 5/19/20





 15:04 19:56 20:00 23:01


 


Temp 98.0 98.1  98.1





 98.0 98.1  98.1


 


Pulse 87 80  75


 


Resp 18 18  18


 


B/P (MAP) 140/90 (107) 147/92 (110)  152/99 (116)


 


Pulse Ox 97 96  95


 


O2 Delivery Room Air Room Air Room Air Room Air


 


    





    





 5/20/20 5/20/20 5/20/20 





 07:00 08:30 10:54 


 


Temp 99.2  98.3 





 99.2  98.3 


 


Pulse 86  74 


 


Resp 16  18 


 


B/P (MAP) 151/110 (124)  147/94 (111) 


 


Pulse Ox 98  96 


 


O2 Delivery Room Air Room Air Room Air 














Intake and Output   


 


 5/19/20 5/19/20 5/20/20





 15:00 23:00 07:00


 


Intake Total 440 ml 120 ml 300 ml


 


Output Total 400 ml 150 ml 500 ml


 


Balance 40 ml -30 ml -200 ml

















MOLLY VEGA MD             May 20, 2020 13:22

## 2020-05-20 NOTE — PDOC3
Discharge Summary


Visit Information


Date of Admission:  May 18, 2020


Date of Discharge:  May 20, 2020


Admitting Diagnosis Comment:


Alcohol withdrawal seizure


Alcoholism


Thrombocytopenia


Minimal metabolic acidosis with with high anion gap


Reactive hypoglycemia


Final Diagnosis


Problems


Medical Problems:


(1) Alcohol withdrawal seizure


Status: Acute  





Alcohol withdrawal seizure


Alcoholism


Thrombocytopenia


Minimal metabolic acidosis with with high anion gap


Reactive hypoglycemia





Brief Hospital Course


Allergies





                                    Allergies








Coded Allergies Type Severity Reaction Last Updated Verified


 


  No Known Drug Allergies    20 No








Vital Signs





Vital Signs








  Date Time  Temp Pulse Resp B/P (MAP) Pulse Ox O2 Delivery O2 Flow Rate FiO2


 


20 10:54 98.3 74 18 147/94 (111) 96 Room Air  





 98.3       








Lab Results





Laboratory Tests








Test


 20


09:00


 


White Blood Count


 6.0 x10^3/uL


(4.0-11.0)


 


Red Blood Count


 4.82 x10^6/uL


(4.30-5.70)


 


Hemoglobin


 15.3 g/dL


(13.0-17.5)


 


Hematocrit


 44.5 %


(39.0-53.0)


 


Mean Corpuscular Volume 92 fL () 


 


Mean Corpuscular Hemoglobin 32 pg (25-35) 


 


Mean Corpuscular Hemoglobin


Concent 34 g/dL


(31-37)


 


Red Cell Distribution Width


 15.1 %


(11.5-14.5)


 


Platelet Count


 101 x10^3/uL


(140-400)


 


Neutrophils (%) (Auto) 71 % (31-73) 


 


Lymphocytes (%) (Auto) 10 % (24-48) 


 


Monocytes (%) (Auto) 16 % (0-9) 


 


Eosinophils (%) (Auto) 2 % (0-3) 


 


Basophils (%) (Auto) 1 % (0-3) 


 


Neutrophils # (Auto)


 4.3 x10^3/uL


(1.8-7.7)


 


Lymphocytes # (Auto)


 0.6 x10^3/uL


(1.0-4.8)


 


Monocytes # (Auto)


 1.0 x10^3/uL


(0.0-1.1)


 


Eosinophils # (Auto)


 0.1 x10^3/uL


(0.0-0.7)


 


Basophils # (Auto)


 0.1 x10^3/uL


(0.0-0.2)


 


Sodium Level


 136 mmol/L


(136-145)


 


Potassium Level


 3.3 mmol/L


(3.5-5.1)


 


Chloride Level


 101 mmol/L


()


 


Carbon Dioxide Level


 25 mmol/L


(21-32)


 


Anion Gap 10 (6-14) 


 


Blood Urea Nitrogen


 10 mg/dL


(8-26)


 


Creatinine


 0.8 mg/dL


(0.7-1.3)


 


Estimated GFR


(Cockcroft-Gault) 100.7 





 


BUN/Creatinine Ratio 13 (6-20) 


 


Glucose Level


 120 mg/dL


(70-99)


 


Calcium Level


 8.6 mg/dL


(8.5-10.1)


 


Total Bilirubin


 2.4 mg/dL


(0.2-1.0)


 


Aspartate Amino Transf


(AST/SGOT) 257 U/L


(15-37)


 


Alanine Aminotransferase


(ALT/SGPT) 146 U/L


(16-63)


 


Alkaline Phosphatase


 102 U/L


()


 


Total Protein


 7.0 g/dL


(6.4-8.2)


 


Albumin


 3.1 g/dL


(3.4-5.0)


 


Albumin/Globulin Ratio 0.8 (1.0-1.7) 








Laboratory Tests








Test


 20


09:00


 


White Blood Count


 6.0 x10^3/uL


(4.0-11.0)


 


Red Blood Count


 4.82 x10^6/uL


(4.30-5.70)


 


Hemoglobin


 15.3 g/dL


(13.0-17.5)


 


Hematocrit


 44.5 %


(39.0-53.0)


 


Mean Corpuscular Volume 92 fL () 


 


Mean Corpuscular Hemoglobin 32 pg (25-35) 


 


Mean Corpuscular Hemoglobin


Concent 34 g/dL


(31-37)


 


Red Cell Distribution Width


 15.1 %


(11.5-14.5)


 


Platelet Count


 101 x10^3/uL


(140-400)


 


Neutrophils (%) (Auto) 71 % (31-73) 


 


Lymphocytes (%) (Auto) 10 % (24-48) 


 


Monocytes (%) (Auto) 16 % (0-9) 


 


Eosinophils (%) (Auto) 2 % (0-3) 


 


Basophils (%) (Auto) 1 % (0-3) 


 


Neutrophils # (Auto)


 4.3 x10^3/uL


(1.8-7.7)


 


Lymphocytes # (Auto)


 0.6 x10^3/uL


(1.0-4.8)


 


Monocytes # (Auto)


 1.0 x10^3/uL


(0.0-1.1)


 


Eosinophils # (Auto)


 0.1 x10^3/uL


(0.0-0.7)


 


Basophils # (Auto)


 0.1 x10^3/uL


(0.0-0.2)


 


Sodium Level


 136 mmol/L


(136-145)


 


Potassium Level


 3.3 mmol/L


(3.5-5.1)


 


Chloride Level


 101 mmol/L


()


 


Carbon Dioxide Level


 25 mmol/L


(21-32)


 


Anion Gap 10 (6-14) 


 


Blood Urea Nitrogen


 10 mg/dL


(8-26)


 


Creatinine


 0.8 mg/dL


(0.7-1.3)


 


Estimated GFR


(Cockcroft-Gault) 100.7 





 


BUN/Creatinine Ratio 13 (6-20) 


 


Glucose Level


 120 mg/dL


(70-99)


 


Calcium Level


 8.6 mg/dL


(8.5-10.1)


 


Total Bilirubin


 2.4 mg/dL


(0.2-1.0)


 


Aspartate Amino Transf


(AST/SGOT) 257 U/L


(15-37)


 


Alanine Aminotransferase


(ALT/SGPT) 146 U/L


(16-63)


 


Alkaline Phosphatase


 102 U/L


()


 


Total Protein


 7.0 g/dL


(6.4-8.2)


 


Albumin


 3.1 g/dL


(3.4-5.0)


 


Albumin/Globulin Ratio 0.8 (1.0-1.7) 








Brief Hospital Course


Mr. Lopez  is a 55 old male  who presented with the above-mentioned 

withdrawal seizure as a consequence of his alcoholism.  Patient did not present 

any events during his brief hospital stay and he responded well to multivitamins

banana bags and Ativan for his withdrawal symptoms.  He was able to be more 

steady on his feet and he did not require Ativan on the last hospital stay.  

Extensive counseling took place prior to dismissal and the importance of trying 

to obtain sobriety moving forward.  I have explained the deleterious effects of 

alcohol consumption his body and the ability that he has of healing if he 

decides to abstain from drinking.  Reassurance was provided signs and symptoms 

of alarm were discussed prior to dismissal all of his concerns were addressed to

the best of my ability





Gen.: well-developed well-nourished in no apparent distress


Head: Normal shape atraumatic


Eyes: Pupils equal reactive to light and accommodation, normal conjunctivae and 

lids


Ears: Normal shape


Nose: Normal shape no trauma


Mouth: No exudates of the back of throat no thrush no lesions


Neck: Supple no JVD no carotid bruit or lymphadenopathy no thyromegaly


Chest: Lungs clear to auscultation with good inspiratory effort no crackles 

rales or rhonchi


Cardiovascular: S1-S2 regular rhythm no murmurs gallops or rubs


Abdomen: Bowel sounds present soft nontender no hepatosplenomegaly appreciated 

sign Extremities: No clubbing no cyanosis no edema peripheral pulses palpated 

bilaterally


Neurological: Alert awake oriented in person time place and situation, cranial 

nerves II through XII intact, no motor or sensory deficits appreciated


Psych: Appropriate mood, cooperative





Assessment


Assessment


                                 IMAGING REPORT





                                     Signed





PATIENT: GIANFRANCO LOPEZ  ACCOUNT: [id number]     MRN#: [id number]


: 1965           LOCATION: ER              AGE: 55


SEX: M                    EXAM DT: 20         ACCESSION#: [id number]


STATUS: REG ER            ORD. PHYSICIAN: SHERLY CHILDERS DO


REASON: had a seizure, fell down hit  head on ground, headache, neck pain


PROCEDURE: CT HEAD AND CERVICAL SPINE WO





CT head and cervical spine without contrast


 


History: Seizure, fall, hit head on ground, headache and neck pain


 


Technique: Noncontrast CT imaging was performed of the head and cervical 


spine. Multiplanar reconstruction images are submitted.  Exposure: One or 


more of the following individualized dose reduction techniques were 


utilized for this examination:  1. Automated exposure control  2. 


Adjustment of the mA and/or kV according to patient size  3. Use of 


iterative reconstruction technique.


 


Head CT 


 


Comparison: None


 


Findings: No acute extra-axial or parenchymal hemorrhage is identified. 


There is no significant intra-axial mass effect, midline shift, or 


extra-axial fluid collection. The gray-white differentiation of the major 


vascular territories is preserved. The ventricles, sulci, and cisterns are


within normal limits in size and configuration.   The mastoid air cells 


and the visualized paranasal sinuses are aerated. There is no significant 


focal calvarial abnormality.


 


Impression:


1. No acute intracranial abnormality is identified.


 


Cervical spine CT 


 


Comparison:  None


 


Findings: No acute cervical spine fracture is identified. Vertebral body 


stature and AP alignment are within normal limits. Atlanto-axial distance 


is within normal limits. There is appropriate alignment of lateral masses 


of C1 relative to C2. Occipital condylar-C1 relationship is maintained. 


There is mild-to-moderate C5-6 degenerative disc disease, also mild 


spondylosis at this level. There is multilevel cervical facet degenerative


change.


 


Impression:


1. No acute cervical spine fracture is identified.


2. There is C5-6 degenerative disc disease and spondylosis.


 


Electronically signed by: Kana Ochoa MD (2020 5:16 PM) [facility]


                    [address] [place] [address]


                                 [phone]


                                        


                                 IMAGING REPORT





                                     Signed





PATIENT: GIANFRANCO LOPEZ  ACCOUNT: [id number]     MRN#: [id number]


: 1965           LOCATION: [address] [place] [address]         AGE: 55


SEX: M                    EXAM DT: 20         ACCESSION#: [id number]


STATUS: ADM IN            ORD. PHYSICIAN: SHERLY CHILDERS DO


REASON: fell, right elbow injured


PROCEDURE: ELBOW RIGHT 3V





PROCEDURE: ELBOW RIGHT 3V


 


STUDY DATE: 2020


 


CLINICAL INDICATION / HISTORY: Right elbow pain after a fall.


 


TECHNIQUE: Right Elbow 3 views


 


COMPARISON: None


 


FINDINGS: AP lateral and oblique views of the right elbow demonstrate no 


evidence of fracture, subluxation, or dislocation. Soft tissues 


unremarkable.


 


IMPRESSION: No acute osseous abnormality.


 


Electronically signed by: Garry Trejo MD (2020 6:17 PM) 


[facility]





Discharge Information


Condition at Discharge:  Improved


Follow Up:  Weeks


Disposition/Orders:  D/C to Home


Scheduled


Thiamine Mononitrate (Vitamin B-1) 100 Mg Tablet, 100 MG PO DAILY for supplement

for 30 Days, #30


   Prescribed by: MOLLY VEGA MD on 20 1309


[Folic Acid] 1 MG TABLET, 1 MG PO DAILY for supplement for 30 Days, #30


   Prescribed by: MOLLY VEGA MD on 20 1309





Scheduled PRN


Lorazepam (Ativan) 1 Mg Tablet, 4 MG PO PRN Q4HRS PRN for For CIWA 8-14 for 5 

Days, #20


   Prescribed by: MOLYL VEGA MD on 20 1309











MOLLY VEGA MD             May 20, 2020 13:21